# Patient Record
Sex: FEMALE | Race: NATIVE HAWAIIAN OR OTHER PACIFIC ISLANDER | NOT HISPANIC OR LATINO | URBAN - METROPOLITAN AREA
[De-identification: names, ages, dates, MRNs, and addresses within clinical notes are randomized per-mention and may not be internally consistent; named-entity substitution may affect disease eponyms.]

---

## 2023-12-18 ENCOUNTER — INPATIENT (INPATIENT)
Facility: HOSPITAL | Age: 53
LOS: 0 days | Discharge: HOME | DRG: 313 | End: 2023-12-19
Attending: INTERNAL MEDICINE | Admitting: INTERNAL MEDICINE
Payer: COMMERCIAL

## 2023-12-18 VITALS
SYSTOLIC BLOOD PRESSURE: 169 MMHG | OXYGEN SATURATION: 94 % | HEART RATE: 64 BPM | WEIGHT: 145.06 LBS | DIASTOLIC BLOOD PRESSURE: 107 MMHG | HEIGHT: 67 IN | TEMPERATURE: 99 F | RESPIRATION RATE: 20 BRPM

## 2023-12-18 DIAGNOSIS — R07.9 CHEST PAIN, UNSPECIFIED: ICD-10-CM

## 2023-12-18 LAB
ALBUMIN SERPL ELPH-MCNC: 4.3 G/DL — SIGNIFICANT CHANGE UP (ref 3.3–5)
ALBUMIN SERPL ELPH-MCNC: 4.3 G/DL — SIGNIFICANT CHANGE UP (ref 3.3–5)
ALP SERPL-CCNC: 56 U/L — SIGNIFICANT CHANGE UP (ref 40–120)
ALP SERPL-CCNC: 56 U/L — SIGNIFICANT CHANGE UP (ref 40–120)
ALT FLD-CCNC: 20 U/L — SIGNIFICANT CHANGE UP (ref 10–45)
ALT FLD-CCNC: 20 U/L — SIGNIFICANT CHANGE UP (ref 10–45)
ANION GAP SERPL CALC-SCNC: 12 MMOL/L — SIGNIFICANT CHANGE UP (ref 5–17)
ANION GAP SERPL CALC-SCNC: 12 MMOL/L — SIGNIFICANT CHANGE UP (ref 5–17)
AST SERPL-CCNC: 23 U/L — SIGNIFICANT CHANGE UP (ref 10–40)
AST SERPL-CCNC: 23 U/L — SIGNIFICANT CHANGE UP (ref 10–40)
BASOPHILS # BLD AUTO: 0.06 K/UL — SIGNIFICANT CHANGE UP (ref 0–0.2)
BASOPHILS # BLD AUTO: 0.06 K/UL — SIGNIFICANT CHANGE UP (ref 0–0.2)
BASOPHILS NFR BLD AUTO: 0.7 % — SIGNIFICANT CHANGE UP (ref 0–2)
BASOPHILS NFR BLD AUTO: 0.7 % — SIGNIFICANT CHANGE UP (ref 0–2)
BILIRUB SERPL-MCNC: 0.3 MG/DL — SIGNIFICANT CHANGE UP (ref 0.2–1.2)
BILIRUB SERPL-MCNC: 0.3 MG/DL — SIGNIFICANT CHANGE UP (ref 0.2–1.2)
BUN SERPL-MCNC: 12 MG/DL — SIGNIFICANT CHANGE UP (ref 7–23)
BUN SERPL-MCNC: 12 MG/DL — SIGNIFICANT CHANGE UP (ref 7–23)
CALCIUM SERPL-MCNC: 9.5 MG/DL — SIGNIFICANT CHANGE UP (ref 8.4–10.5)
CALCIUM SERPL-MCNC: 9.5 MG/DL — SIGNIFICANT CHANGE UP (ref 8.4–10.5)
CHLORIDE SERPL-SCNC: 101 MMOL/L — SIGNIFICANT CHANGE UP (ref 96–108)
CHLORIDE SERPL-SCNC: 101 MMOL/L — SIGNIFICANT CHANGE UP (ref 96–108)
CO2 SERPL-SCNC: 25 MMOL/L — SIGNIFICANT CHANGE UP (ref 22–31)
CO2 SERPL-SCNC: 25 MMOL/L — SIGNIFICANT CHANGE UP (ref 22–31)
CREAT SERPL-MCNC: 0.57 MG/DL — SIGNIFICANT CHANGE UP (ref 0.5–1.3)
CREAT SERPL-MCNC: 0.57 MG/DL — SIGNIFICANT CHANGE UP (ref 0.5–1.3)
EGFR: 109 ML/MIN/1.73M2 — SIGNIFICANT CHANGE UP
EGFR: 109 ML/MIN/1.73M2 — SIGNIFICANT CHANGE UP
EOSINOPHIL # BLD AUTO: 0.19 K/UL — SIGNIFICANT CHANGE UP (ref 0–0.5)
EOSINOPHIL # BLD AUTO: 0.19 K/UL — SIGNIFICANT CHANGE UP (ref 0–0.5)
EOSINOPHIL NFR BLD AUTO: 2.1 % — SIGNIFICANT CHANGE UP (ref 0–6)
EOSINOPHIL NFR BLD AUTO: 2.1 % — SIGNIFICANT CHANGE UP (ref 0–6)
GLUCOSE SERPL-MCNC: 95 MG/DL — SIGNIFICANT CHANGE UP (ref 70–99)
GLUCOSE SERPL-MCNC: 95 MG/DL — SIGNIFICANT CHANGE UP (ref 70–99)
HCG SERPL-ACNC: <2 MIU/ML — SIGNIFICANT CHANGE UP
HCG SERPL-ACNC: <2 MIU/ML — SIGNIFICANT CHANGE UP
HCT VFR BLD CALC: 39.9 % — SIGNIFICANT CHANGE UP (ref 34.5–45)
HCT VFR BLD CALC: 39.9 % — SIGNIFICANT CHANGE UP (ref 34.5–45)
HGB BLD-MCNC: 12.9 G/DL — SIGNIFICANT CHANGE UP (ref 11.5–15.5)
HGB BLD-MCNC: 12.9 G/DL — SIGNIFICANT CHANGE UP (ref 11.5–15.5)
IMM GRANULOCYTES NFR BLD AUTO: 0.4 % — SIGNIFICANT CHANGE UP (ref 0–0.9)
IMM GRANULOCYTES NFR BLD AUTO: 0.4 % — SIGNIFICANT CHANGE UP (ref 0–0.9)
LYMPHOCYTES # BLD AUTO: 2.02 K/UL — SIGNIFICANT CHANGE UP (ref 1–3.3)
LYMPHOCYTES # BLD AUTO: 2.02 K/UL — SIGNIFICANT CHANGE UP (ref 1–3.3)
LYMPHOCYTES # BLD AUTO: 22.1 % — SIGNIFICANT CHANGE UP (ref 13–44)
LYMPHOCYTES # BLD AUTO: 22.1 % — SIGNIFICANT CHANGE UP (ref 13–44)
MCHC RBC-ENTMCNC: 30 PG — SIGNIFICANT CHANGE UP (ref 27–34)
MCHC RBC-ENTMCNC: 30 PG — SIGNIFICANT CHANGE UP (ref 27–34)
MCHC RBC-ENTMCNC: 32.3 GM/DL — SIGNIFICANT CHANGE UP (ref 32–36)
MCHC RBC-ENTMCNC: 32.3 GM/DL — SIGNIFICANT CHANGE UP (ref 32–36)
MCV RBC AUTO: 92.8 FL — SIGNIFICANT CHANGE UP (ref 80–100)
MCV RBC AUTO: 92.8 FL — SIGNIFICANT CHANGE UP (ref 80–100)
MONOCYTES # BLD AUTO: 0.67 K/UL — SIGNIFICANT CHANGE UP (ref 0–0.9)
MONOCYTES # BLD AUTO: 0.67 K/UL — SIGNIFICANT CHANGE UP (ref 0–0.9)
MONOCYTES NFR BLD AUTO: 7.3 % — SIGNIFICANT CHANGE UP (ref 2–14)
MONOCYTES NFR BLD AUTO: 7.3 % — SIGNIFICANT CHANGE UP (ref 2–14)
NEUTROPHILS # BLD AUTO: 6.16 K/UL — SIGNIFICANT CHANGE UP (ref 1.8–7.4)
NEUTROPHILS # BLD AUTO: 6.16 K/UL — SIGNIFICANT CHANGE UP (ref 1.8–7.4)
NEUTROPHILS NFR BLD AUTO: 67.4 % — SIGNIFICANT CHANGE UP (ref 43–77)
NEUTROPHILS NFR BLD AUTO: 67.4 % — SIGNIFICANT CHANGE UP (ref 43–77)
NRBC # BLD: 0 /100 WBCS — SIGNIFICANT CHANGE UP (ref 0–0)
NRBC # BLD: 0 /100 WBCS — SIGNIFICANT CHANGE UP (ref 0–0)
PLATELET # BLD AUTO: 285 K/UL — SIGNIFICANT CHANGE UP (ref 150–400)
PLATELET # BLD AUTO: 285 K/UL — SIGNIFICANT CHANGE UP (ref 150–400)
POTASSIUM SERPL-MCNC: 4 MMOL/L — SIGNIFICANT CHANGE UP (ref 3.5–5.3)
POTASSIUM SERPL-MCNC: 4 MMOL/L — SIGNIFICANT CHANGE UP (ref 3.5–5.3)
POTASSIUM SERPL-SCNC: 4 MMOL/L — SIGNIFICANT CHANGE UP (ref 3.5–5.3)
POTASSIUM SERPL-SCNC: 4 MMOL/L — SIGNIFICANT CHANGE UP (ref 3.5–5.3)
PROT SERPL-MCNC: 7.4 G/DL — SIGNIFICANT CHANGE UP (ref 6–8.3)
PROT SERPL-MCNC: 7.4 G/DL — SIGNIFICANT CHANGE UP (ref 6–8.3)
RBC # BLD: 4.3 M/UL — SIGNIFICANT CHANGE UP (ref 3.8–5.2)
RBC # BLD: 4.3 M/UL — SIGNIFICANT CHANGE UP (ref 3.8–5.2)
RBC # FLD: 12.8 % — SIGNIFICANT CHANGE UP (ref 10.3–14.5)
RBC # FLD: 12.8 % — SIGNIFICANT CHANGE UP (ref 10.3–14.5)
SODIUM SERPL-SCNC: 138 MMOL/L — SIGNIFICANT CHANGE UP (ref 135–145)
SODIUM SERPL-SCNC: 138 MMOL/L — SIGNIFICANT CHANGE UP (ref 135–145)
TROPONIN T, HIGH SENSITIVITY RESULT: <6 NG/L — SIGNIFICANT CHANGE UP (ref 0–51)
TROPONIN T, HIGH SENSITIVITY RESULT: <6 NG/L — SIGNIFICANT CHANGE UP (ref 0–51)
WBC # BLD: 9.14 K/UL — SIGNIFICANT CHANGE UP (ref 3.8–10.5)
WBC # BLD: 9.14 K/UL — SIGNIFICANT CHANGE UP (ref 3.8–10.5)
WBC # FLD AUTO: 9.14 K/UL — SIGNIFICANT CHANGE UP (ref 3.8–10.5)
WBC # FLD AUTO: 9.14 K/UL — SIGNIFICANT CHANGE UP (ref 3.8–10.5)

## 2023-12-18 PROCEDURE — 71045 X-RAY EXAM CHEST 1 VIEW: CPT | Mod: 26

## 2023-12-18 PROCEDURE — 99285 EMERGENCY DEPT VISIT HI MDM: CPT

## 2023-12-18 RX ORDER — PANTOPRAZOLE SODIUM 20 MG/1
40 TABLET, DELAYED RELEASE ORAL ONCE
Refills: 0 | Status: COMPLETED | OUTPATIENT
Start: 2023-12-18 | End: 2023-12-18

## 2023-12-18 RX ORDER — METOPROLOL TARTRATE 50 MG
1 TABLET ORAL
Refills: 0 | DISCHARGE

## 2023-12-18 RX ORDER — ASPIRIN/CALCIUM CARB/MAGNESIUM 324 MG
81 TABLET ORAL DAILY
Refills: 0 | Status: DISCONTINUED | OUTPATIENT
Start: 2023-12-18 | End: 2023-12-19

## 2023-12-18 RX ORDER — ASPIRIN/CALCIUM CARB/MAGNESIUM 324 MG
1 TABLET ORAL
Refills: 0 | DISCHARGE

## 2023-12-18 RX ORDER — PANTOPRAZOLE SODIUM 20 MG/1
40 TABLET, DELAYED RELEASE ORAL
Refills: 0 | Status: DISCONTINUED | OUTPATIENT
Start: 2023-12-18 | End: 2023-12-19

## 2023-12-18 RX ORDER — ACETAMINOPHEN 500 MG
650 TABLET ORAL ONCE
Refills: 0 | Status: COMPLETED | OUTPATIENT
Start: 2023-12-18 | End: 2023-12-18

## 2023-12-18 RX ORDER — OMEPRAZOLE 10 MG/1
1 CAPSULE, DELAYED RELEASE ORAL
Refills: 0 | DISCHARGE

## 2023-12-18 RX ORDER — METOPROLOL TARTRATE 50 MG
25 TABLET ORAL
Refills: 0 | Status: DISCONTINUED | OUTPATIENT
Start: 2023-12-18 | End: 2023-12-19

## 2023-12-18 RX ORDER — ATORVASTATIN CALCIUM 80 MG/1
1 TABLET, FILM COATED ORAL
Refills: 0 | DISCHARGE

## 2023-12-18 RX ADMIN — Medication 650 MILLIGRAM(S): at 20:45

## 2023-12-18 RX ADMIN — PANTOPRAZOLE SODIUM 40 MILLIGRAM(S): 20 TABLET, DELAYED RELEASE ORAL at 17:29

## 2023-12-18 RX ADMIN — Medication 650 MILLIGRAM(S): at 21:45

## 2023-12-18 NOTE — ED ADULT NURSE NOTE - NS ED NURSE REPORT GIVEN TO FT
NALLELY MARES in [facility] NALLELY MARES in [facility]

## 2023-12-18 NOTE — ED PROVIDER NOTE - ATTENDING CONTRIBUTION TO CARE
Attending MD Christian:  I personally have seen and examined this patient.  Resident note reviewed and agree on plan of care and except where noted.  Please see my MDM for further details.

## 2023-12-18 NOTE — ED PROVIDER NOTE - OBJECTIVE STATEMENT
53 y F, hx of CAD, s/p cath in [place] a few years ago, on aspirin, HTN, HLD, p/w 1-day chest pain started while she was eating.  Took 4 x 81 mg of aspirin.  Currently asymptomatic. no recent illness.  Patient follows cardiologist office in [place]. Last visit was a year ago. History obtained via Mandarin translation by Brittney [id number] 53 y F, hx of CAD, s/p cath in [place] a few years ago, on aspirin, HTN, HLD, p/w 1-day chest pain started while she was eating.  Took 4 x 81 mg of aspirin.  Currently asymptomatic. no recent illness.  Patient follows cardiologist office in [place]. Last visit was a year ago. History obtained via Mandarin translation by Brittney [id number]

## 2023-12-18 NOTE — ED ADULT NURSE NOTE - OBJECTIVE STATEMENT
54 y/o female with PMH MI 2 years ago BIBEMS presenting to ED for sharp midsternal chest pain that started today. EMS gave 324ASA. Pt now has no chest pain. Upon exam pt A&Ox3 gross neuro intact,  no difficulty speaking in complete sentences, s1s2 heart sounds heard, pulses x 4, franco x4, abdomen soft nontender nondistended, skin intact. pt denies chest pain, sob, ha, n/v/d, abdominal pain, f/c, urinary symptoms, hematuria.

## 2023-12-18 NOTE — ED PROVIDER NOTE - CLINICAL SUMMARY MEDICAL DECISION MAKING FREE TEXT BOX
53 y F, hx of CAD, s/p cath in [place] a few years ago, on aspirin, HTN, HLD, p/w 1-day chest pain started while she was eating.  Took 4 x 81 mg of aspirin.  Currently asymptomatic. no recent illness.  Patient follows cardiologist office in [place]. Last visit was a year ago. VSWNL on arrival. PE as noted above. will get EKG, cardiac workup, touch base with cardiology office, admit. 53 y F, hx of CAD, s/p cath in [place] a few years ago, on aspirin, HTN, HLD, p/w 1-day chest pain started while she was eating.  Took 4 x 81 mg of aspirin.  Currently asymptomatic. no recent illness.  Patient follows cardiologist office in [place]. Last visit was a year ago. VSWNL on arrival. PE as noted above. will get EKG, cardiac workup, touch base with cardiology office, admit. 53 y F, hx of CAD, s/p cath in [place] a few years ago, on aspirin, HTN, HLD, p/w 1-day chest pain started while she was eating.  Took 4 x 81 mg of aspirin.  Currently asymptomatic. no recent illness.  Patient follows cardiologist office in [place]. Last visit was a year ago. VSWNL on arrival. PE as noted above. will get EKG, cardiac workup, touch base with cardiology office, admit.    Attending MD Christian: 54 yo female with PMH for CAD, HTN, HLD presents with complaint of chest pain which occurred while eating.  No CP now.  Exam: A & O x 3, NAD, HEENT WNL and no facial asymmetry; lungs CTAB, heart with reg rhythm without murmur; abdomen soft NTND; extremities with no edema; skin with no rashes, neuro exam non focal with no motor or sensory deficits. DDX esophageal spasm, GERD, ACS vs angina.  Plan: Cardiac Monitor, EKG, Labs/cardiac enzymes, ASA (patient took PTA), CXR and admit to telemetry for further workup. 53 y F, hx of CAD, s/p cath in [place] a few years ago, on aspirin, HTN, HLD, p/w 1-day chest pain started while she was eating.  Took 4 x 81 mg of aspirin.  Currently asymptomatic. no recent illness.  Patient follows cardiologist office in [place]. Last visit was a year ago. VSWNL on arrival. PE as noted above. will get EKG, cardiac workup, touch base with cardiology office, admit.    Attending MD Christian: 54 yo female with PMH for CAD, HTN, HLD presents with complaint of chest pain which occurred while eating.  No CP now.  Exam: A & O x 3, NAD, HEENT WNL and no facial asymmetry; lungs CTAB, heart with reg rhythm without murmur; abdomen soft NTND; extremities with no edema; skin with no rashes, neuro exam non focal with no motor or sensory deficits. DDX esophageal spasm, GERD, ACS vs angina.  Plan: Cardiac Monitor, EKG, Labs/cardiac enzymes, ASA (patient took PTA), CXR and admit to telemetry for further workup.

## 2023-12-18 NOTE — H&P ADULT - HISTORY OF PRESENT ILLNESS
Date of Service, 12-18-23 @ 20:50  CHIEF COMPLAINT:Patient is a 53y old  Female who presents with a chief complaint of     HPI:  53 y F, hx of CAD, s/p cath in [place] a few years ago, on aspirin, HTN, HLD, p/w 1-day chest pain started while she was eating.  Took 4 x 81 mg of aspirin.  Currently asymptomatic. no recent illness.  Patient follows cardiologist office in [place]. Last visit was a year ago. History obtained via Mandarin translation by Brittney [id number]    PAST MEDICAL & SURGICAL HISTORY:  HTN (hypertension)  HLD (hyperlipidemia)      MEDICATIONS  (STANDING):  noted    MEDICATIONS  (PRN):      FAMILY HISTORY:      SOCIAL HISTORY:    [x ] Non-smoker  [ ] Smoker  [ ] Alcohol    Allergies    No Known Allergies    Intolerances    	    REVIEW OF SYSTEMS:  CONSTITUTIONAL: No fever, weight loss, or fatigue  EYES: No eye pain, visual disturbances, or discharge  ENT:  No difficulty hearing, tinnitus, vertigo; No sinus or throat pain  NECK: No pain or stiffness  RESPIRATORY: No cough, wheezing, chills or hemoptysis; No Shortness of Breath  CARDIOVASCULAR: + chest pain,no  palpitations, passing out, dizziness, or leg swelling  GASTROINTESTINAL: No abdominal or epigastric pain. No nausea, vomiting, or hematemesis; No diarrhea or constipation. No melena or hematochezia.  GENITOURINARY: No dysuria, frequency, hematuria, or incontinence  NEUROLOGICAL: No headaches, memory loss, loss of strength, numbness, or tremors  SKIN: No itching, burning, rashes, or lesions   LYMPH Nodes: No enlarged glands  ENDOCRINE: No heat or cold intolerance; No hair loss  MUSCULOSKELETAL: No joint pain or swelling; No muscle, back, or extremity pain  PSYCHIATRIC: No depression, anxiety, mood swings, or difficulty sleeping  HEME/LYMPH: No easy bruising, or bleeding gums  ALLERGY AND IMMUNOLOGIC: No hives or eczema	    [x ] All others negative	  [ ] Unable to obtain    PHYSICAL EXAM:  T(C): 36.8 (12-18-23 @ 19:27), Max: 37.2 (12-18-23 @ 14:04)  HR: 65 (12-18-23 @ 19:27) (58 - 65)  BP: 132/84 (12-18-23 @ 19:27) (132/84 - 169/107)  RR: 18 (12-18-23 @ 19:27) (18 - 20)  SpO2: 95% (12-18-23 @ 19:27) (94% - 100%)  Wt(kg): --  I&O's Summary      Appearance: Normal	  HEENT:   Normal oral mucosa, PERRL, EOMI	  Lymphatic: No lymphadenopathy  Cardiovascular: Normal S1 S2, No JVD, + murmurs, No edema  Respiratory: rhonchi  Psychiatry: A & O x 3, Mood & affect appropriate  Gastrointestinal:  Soft, Non-tender, + BS	  Skin: No rashes, No ecchymoses, No cyanosis	  Neurologic: Non-focal  Extremities: Normal range of motion, No clubbing, cyanosis or edema  Vascular: Peripheral pulses palpable 2+ bilaterally    TELEMETRY: 	    ECG:  	  RADIOLOGY:  OTHER: 	  	  LABS:	 	    CARDIAC MARKERS:                              12.9   9.14  )-----------( 285      ( 18 Dec 2023 14:23 )             39.9     12-18    138  |  101  |  12  ----------------------------<  95  4.0   |  25  |  0.57    Ca    9.5      18 Dec 2023 14:23    TPro  7.4  /  Alb  4.3  /  TBili  0.3  /  DBili  x   /  AST  23  /  ALT  20  /  AlkPhos  56  12-18    proBNP:   Lipid Profile:   HgA1c:   TSH:   < from: Xray Chest 1 View- PORTABLE-Urgent (12.18.23 @ 14:25) >  The heart is normal in size.  The lungs are clear.  There is no pneumothorax or pleural effusion.  No acute bony abnormality.    IMPRESSION:  Clear lungs.    Troponin T, High Sensitivity (12.18.23 @ 14:23)    Troponin T, High Sensitivity Result: <6: Moderate Hemolysis,  Troponin T results may be falsely decreased  *  *  Rapid upward or downward changes in high-sensitivity troponin levels  suggest acute myocardial injury. Renal impairment may cause sustained  troponin elevations.  Normal: <6 - 14ng/L  Indeterminate: 15-51 ng/L  Elevated: > 51 ng/L  See http://labs/test/TROPTHS on the [facility] intranet for more  information ng/L                    Date of Service, 12-18-23 @ 20:50  CHIEF COMPLAINT:Patient is a 53y old  Female who presents with a chief complaint of     HPI:  53 y F, hx of CAD, s/p cath in [place] a few years ago, on aspirin, HTN, HLD, p/w 1-day chest pain started while she was eating.  Took 4 x 81 mg of aspirin.  Currently asymptomatic. no recent illness.  Patient follows cardiologist office in [place]. Last visit was a year ago. History obtained via Mandarin translation by Brittney [id number]    PAST MEDICAL & SURGICAL HISTORY:  HTN (hypertension)  HLD (hyperlipidemia)      MEDICATIONS  (STANDING):  noted    MEDICATIONS  (PRN):      FAMILY HISTORY:      SOCIAL HISTORY:    [x ] Non-smoker  [ ] Smoker  [ ] Alcohol    Allergies    No Known Allergies    Intolerances    	    REVIEW OF SYSTEMS:  CONSTITUTIONAL: No fever, weight loss, or fatigue  EYES: No eye pain, visual disturbances, or discharge  ENT:  No difficulty hearing, tinnitus, vertigo; No sinus or throat pain  NECK: No pain or stiffness  RESPIRATORY: No cough, wheezing, chills or hemoptysis; No Shortness of Breath  CARDIOVASCULAR: + chest pain,no  palpitations, passing out, dizziness, or leg swelling  GASTROINTESTINAL: No abdominal or epigastric pain. No nausea, vomiting, or hematemesis; No diarrhea or constipation. No melena or hematochezia.  GENITOURINARY: No dysuria, frequency, hematuria, or incontinence  NEUROLOGICAL: No headaches, memory loss, loss of strength, numbness, or tremors  SKIN: No itching, burning, rashes, or lesions   LYMPH Nodes: No enlarged glands  ENDOCRINE: No heat or cold intolerance; No hair loss  MUSCULOSKELETAL: No joint pain or swelling; No muscle, back, or extremity pain  PSYCHIATRIC: No depression, anxiety, mood swings, or difficulty sleeping  HEME/LYMPH: No easy bruising, or bleeding gums  ALLERGY AND IMMUNOLOGIC: No hives or eczema	    [x ] All others negative	  [ ] Unable to obtain    PHYSICAL EXAM:  T(C): 36.8 (12-18-23 @ 19:27), Max: 37.2 (12-18-23 @ 14:04)  HR: 65 (12-18-23 @ 19:27) (58 - 65)  BP: 132/84 (12-18-23 @ 19:27) (132/84 - 169/107)  RR: 18 (12-18-23 @ 19:27) (18 - 20)  SpO2: 95% (12-18-23 @ 19:27) (94% - 100%)  Wt(kg): --  I&O's Summary      Appearance: Normal	  HEENT:   Normal oral mucosa, PERRL, EOMI	  Lymphatic: No lymphadenopathy  Cardiovascular: Normal S1 S2, No JVD, + murmurs, No edema  Respiratory: rhonchi  Psychiatry: A & O x 3, Mood & affect appropriate  Gastrointestinal:  Soft, Non-tender, + BS	  Skin: No rashes, No ecchymoses, No cyanosis	  Neurologic: Non-focal  Extremities: Normal range of motion, No clubbing, cyanosis or edema  Vascular: Peripheral pulses palpable 2+ bilaterally    TELEMETRY: 	    ECG:  	  RADIOLOGY:  OTHER: 	  	  LABS:	 	    CARDIAC MARKERS:                              12.9   9.14  )-----------( 285      ( 18 Dec 2023 14:23 )             39.9     12-18    138  |  101  |  12  ----------------------------<  95  4.0   |  25  |  0.57    Ca    9.5      18 Dec 2023 14:23    TPro  7.4  /  Alb  4.3  /  TBili  0.3  /  DBili  x   /  AST  23  /  ALT  20  /  AlkPhos  56  12-18    proBNP:   Lipid Profile:   HgA1c:   TSH:   < from: Xray Chest 1 View- PORTABLE-Urgent (12.18.23 @ 14:25) >  The heart is normal in size.  The lungs are clear.  There is no pneumothorax or pleural effusion.  No acute bony abnormality.    IMPRESSION:  Clear lungs.    Troponin T, High Sensitivity (12.18.23 @ 14:23)    Troponin T, High Sensitivity Result: <6: Moderate Hemolysis,  Troponin T results may be falsely decreased  *  *  Rapid upward or downward changes in high-sensitivity troponin levels  suggest acute myocardial injury. Renal impairment may cause sustained  troponin elevations.  Normal: <6 - 14ng/L  Indeterminate: 15-51 ng/L  Elevated: > 51 ng/L  See [email or web address]/test/[email or web address] on the [facility] intranet for more  information ng/L

## 2023-12-18 NOTE — ED PROVIDER NOTE - PHYSICAL EXAMINATION
Gen: Patient is well-appearing, NAD, AAOx3, able to ambulate without assistance  HEENT: NCAT, normal conjunctiva, tongue midline, oral mucosa moist  Lung: CTAB, no respiratory distress, no wheezes/rhonchi/rales B/L, speaking in full sentences  CV: RRR, no murmurs, rubs or gallops, distal pulses 2+ b/l  Abd: soft, NT, ND, no guarding, no rigidity, no rebound tenderness, no CVA tenderness   MSK: no visible deformities, ROM normal in UE/LE  Neuro: No focal sensory or motor deficits  Skin: Warm, well perfused, no rash, no leg swelling  Psych: normal affect, calm

## 2023-12-18 NOTE — H&P ADULT - ASSESSMENT
53 y F, hx of CAD, s/p cath in [place] a few years ago, on aspirin, HTN, HLD, p/w 1-day chest pain started while she was eating.  Took 4 x 81 mg of aspirin.  Currently asymptomatic. no recent illness.  Patient follows cardiologist office in [place]. Last visit was a year ago. History obtained via Mandarin translation by Brittney [id number]  pt with sig cardiac risk factor with new onset of chest pain  beta blocker  asa  lipid panel  cardiac ct in am 53 y F, hx of CAD, s/p cath in [place] a few years ago, on aspirin, HTN, HLD, p/w 1-day chest pain started while she was eating.  Took 4 x 81 mg of aspirin.  Currently asymptomatic. no recent illness.  Patient follows cardiologist office in [place]. Last visit was a year ago. History obtained via Mandarin translation by Brittney [id number]  pt with sig cardiac risk factor with new onset of chest pain  beta blocker  asa  lipid panel  cardiac ct in am

## 2023-12-18 NOTE — ED PROVIDER NOTE - PROGRESS NOTE DETAILS
Gabriela PGY3: EKG changes noted compared to prior EKG from patient's private cardiologist a year ago. will admit.

## 2023-12-19 VITALS
RESPIRATION RATE: 17 BRPM | HEART RATE: 68 BPM | OXYGEN SATURATION: 100 % | SYSTOLIC BLOOD PRESSURE: 142 MMHG | DIASTOLIC BLOOD PRESSURE: 92 MMHG | TEMPERATURE: 98 F

## 2023-12-19 LAB
CHOLEST SERPL-MCNC: 171 MG/DL — SIGNIFICANT CHANGE UP
CHOLEST SERPL-MCNC: 171 MG/DL — SIGNIFICANT CHANGE UP
D DIMER BLD IA.RAPID-MCNC: <150 NG/ML DDU — SIGNIFICANT CHANGE UP
D DIMER BLD IA.RAPID-MCNC: <150 NG/ML DDU — SIGNIFICANT CHANGE UP
HDLC SERPL-MCNC: 60 MG/DL — SIGNIFICANT CHANGE UP
HDLC SERPL-MCNC: 60 MG/DL — SIGNIFICANT CHANGE UP
LIPID PNL WITH DIRECT LDL SERPL: 86 MG/DL — SIGNIFICANT CHANGE UP
LIPID PNL WITH DIRECT LDL SERPL: 86 MG/DL — SIGNIFICANT CHANGE UP
NON HDL CHOLESTEROL: 111 MG/DL — SIGNIFICANT CHANGE UP
NON HDL CHOLESTEROL: 111 MG/DL — SIGNIFICANT CHANGE UP
TRIGL SERPL-MCNC: 148 MG/DL — SIGNIFICANT CHANGE UP
TRIGL SERPL-MCNC: 148 MG/DL — SIGNIFICANT CHANGE UP
TSH SERPL-MCNC: 0.58 UIU/ML — SIGNIFICANT CHANGE UP (ref 0.27–4.2)
TSH SERPL-MCNC: 0.58 UIU/ML — SIGNIFICANT CHANGE UP (ref 0.27–4.2)

## 2023-12-19 PROCEDURE — 80053 COMPREHEN METABOLIC PANEL: CPT

## 2023-12-19 PROCEDURE — 84443 ASSAY THYROID STIM HORMONE: CPT

## 2023-12-19 PROCEDURE — 75574 CT ANGIO HRT W/3D IMAGE: CPT | Mod: 26

## 2023-12-19 PROCEDURE — 80061 LIPID PANEL: CPT

## 2023-12-19 PROCEDURE — 71045 X-RAY EXAM CHEST 1 VIEW: CPT

## 2023-12-19 PROCEDURE — 84484 ASSAY OF TROPONIN QUANT: CPT

## 2023-12-19 PROCEDURE — 99285 EMERGENCY DEPT VISIT HI MDM: CPT | Mod: 25

## 2023-12-19 PROCEDURE — 85025 COMPLETE CBC W/AUTO DIFF WBC: CPT

## 2023-12-19 PROCEDURE — 36415 COLL VENOUS BLD VENIPUNCTURE: CPT

## 2023-12-19 PROCEDURE — 85379 FIBRIN DEGRADATION QUANT: CPT

## 2023-12-19 PROCEDURE — 75574 CT ANGIO HRT W/3D IMAGE: CPT

## 2023-12-19 PROCEDURE — 84702 CHORIONIC GONADOTROPIN TEST: CPT

## 2023-12-19 RX ORDER — ACETAMINOPHEN 500 MG
650 TABLET ORAL ONCE
Refills: 0 | Status: COMPLETED | OUTPATIENT
Start: 2023-12-19 | End: 2023-12-19

## 2023-12-19 RX ADMIN — Medication 81 MILLIGRAM(S): at 11:26

## 2023-12-19 RX ADMIN — Medication 650 MILLIGRAM(S): at 13:12

## 2023-12-19 RX ADMIN — Medication 25 MILLIGRAM(S): at 06:20

## 2023-12-19 RX ADMIN — Medication 25 MILLIGRAM(S): at 18:36

## 2023-12-19 RX ADMIN — PANTOPRAZOLE SODIUM 40 MILLIGRAM(S): 20 TABLET, DELAYED RELEASE ORAL at 06:20

## 2023-12-19 RX ADMIN — Medication 650 MILLIGRAM(S): at 11:55

## 2023-12-19 NOTE — DISCHARGE NOTE NURSING/CASE MANAGEMENT/SOCIAL WORK - PATIENT PORTAL LINK FT
You can access the FollowMyHealth Patient Portal offered by [facility] by registering at the following website: [email or web address]. By joining [employer]’s FollowMyHealth portal, you will also be able to view your health information using other applications (apps) compatible with our system. You can access the FollowMyHealth Patient Portal offered by [facility] by registering at the following website: [email or web address]. By joining [employer]’s FollowMyHealth portal, you will also be able to view your health information using other applications (apps) compatible with our system.

## 2023-12-19 NOTE — DISCHARGE NOTE NURSING/CASE MANAGEMENT/SOCIAL WORK - NSDCPEFALRISK_GEN_ALL_CORE
For information on Fall & Injury Prevention, visit: [email or web address] OR  [email or web address] OR  https://www.cdc.gov/steadi/patient.html For information on Fall & Injury Prevention, visit: [email or web address] OR  [email or web address] OR  https://www.cdc.gov/steadi/patient.html

## 2023-12-19 NOTE — DISCHARGE NOTE PROVIDER - PROVIDER TOKENS
PROVIDER:[TOKEN:[[id number]]] PROVIDER:[TOKEN:[[id number]]] FREE:[LAST:[Cher],FIRST:[Md],PHONE:[phone]],FAX:[(   )    -],ADDRESS:[[address] [address]]] FREE:[LAST:[Cher],FIRST:[Md],PHONE:[phone]],FAX:[(   )    -],ADDRESS:[[address] [address]]]

## 2023-12-19 NOTE — DISCHARGE NOTE PROVIDER - NSDCMRMEDTOKEN_GEN_ALL_CORE_FT
aspirin 81 mg oral tablet, chewable: 1 tab(s) chewed once a day  atorvastatin 20 mg oral tablet: 1 tab(s) orally once a day (at bedtime)  benzonatate 200 mg oral capsule: 1 cap(s) orally 3 times a day as needed for cough  metoprolol succinate 25 mg oral tablet, extended release: 1 tab(s) orally once a day  omeprazole 40 mg oral delayed release capsule: 1 cap(s) orally once a day as needed  Refresh ophthalmic solution: 1 drop(s) in each eye 4 times a day as needed

## 2023-12-19 NOTE — DISCHARGE NOTE PROVIDER - NSDCCPCAREPLAN_GEN_ALL_CORE_FT
PRINCIPAL DISCHARGE DIAGNOSIS  Diagnosis: Chest pain  Assessment and Plan of Treatment: You were admitted for Chest pain and had a cardiac workup due to changes noted on your electrocardiogram. You had a CT of your coronaries which did not show evident of cardiac disease or blockage. Please follow up with your cardiologist/PMD if you have further episodes of chest pain or return to the Emergency department.      SECONDARY DISCHARGE DIAGNOSES  Diagnosis: Lung nodule  Assessment and Plan of Treatment: You were found to have a 5 mm left lower lobe nodule on the CT you had done. Your risk of lung cancer is determined by your physician. Please follow up with your PMD to evaluate if further workup of nodule is needed at this time. If low risk, no further follow-up is needed.

## 2023-12-19 NOTE — DISCHARGE NOTE PROVIDER - CARE PROVIDER_API CALL
Terrie Mathews  Cardiovascular Disease  [address] [place] [id number]  Phone: [phone]  Fax: [phone]  Follow Up Time:    Terrie Mathews  Cardiovascular Disease  [address] [place] [id number]  Phone: [phone]  Fax: [phone]  Follow Up Time:    Md Cher  [address] [facility] [address]  Phone: [phone]  Fax: (   )    -  Follow Up Time:    Md Cher  [address] [facility] [address]  Phone: [phone]  Fax: (   )    -  Follow Up Time:

## 2023-12-19 NOTE — PROGRESS NOTE ADULT - SUBJECTIVE AND OBJECTIVE BOX
Date of Service: 12-19-23 @ 08:34           CARDIOLOGY     PROGRESS  NOTE   ________________________________________________    CHIEF COMPLAINT:Patient is a 53y old  Female who presents with a chief complaint of chest pain (18 Dec 2023 20:50)  no complain  	  REVIEW OF SYSTEMS:  CONSTITUTIONAL: No fever, weight loss, or fatigue  EYES: No eye pain, visual disturbances, or discharge  ENT:  No difficulty hearing, tinnitus, vertigo; No sinus or throat pain  NECK: No pain or stiffness  RESPIRATORY: No cough, wheezing, chills or hemoptysis; No Shortness of Breath  CARDIOVASCULAR: No chest pain, palpitations, passing out, dizziness, or leg swelling  GASTROINTESTINAL: No abdominal or epigastric pain. No nausea, vomiting, or hematemesis; No diarrhea or constipation. No melena or hematochezia.  GENITOURINARY: No dysuria, frequency, hematuria, or incontinence  NEUROLOGICAL: No headaches, memory loss, loss of strength, numbness, or tremors  SKIN: No itching, burning, rashes, or lesions   LYMPH Nodes: No enlarged glands  ENDOCRINE: No heat or cold intolerance; No hair loss  MUSCULOSKELETAL: No joint pain or swelling; No muscle, back, or extremity pain  PSYCHIATRIC: No depression, anxiety, mood swings, or difficulty sleeping  HEME/LYMPH: No easy bruising, or bleeding gums  ALLERGY AND IMMUNOLOGIC: No hives or eczema	    [x ] All others negative	  [ ] Unable to obtain    PHYSICAL EXAM:  T(C): 36.7 (12-19-23 @ 05:05), Max: 37.2 (12-18-23 @ 14:04)  HR: 66 (12-19-23 @ 05:05) (58 - 66)  BP: 109/66 (12-19-23 @ 05:05) (109/66 - 169/107)  RR: 18 (12-19-23 @ 05:05) (18 - 20)  SpO2: 96% (12-19-23 @ 05:05) (94% - 100%)  Wt(kg): --  I&O's Summary      Appearance: Normal	  HEENT:   Normal oral mucosa, PERRL, EOMI	  Lymphatic: No lymphadenopathy  Cardiovascular: Normal S1 S2, No JVD, + murmurs, No edema  Respiratory rhonchi  Psychiatry: A & O x 3, Mood & affect appropriate  Gastrointestinal:  Soft, Non-tender, + BS	  Skin: No rashes, No ecchymoses, No cyanosis	  Neurologic: Non-focal  Extremities: Normal range of motion, No clubbing, cyanosis or edema  Vascular: Peripheral pulses palpable 2+ bilaterally    MEDICATIONS  (STANDING):  aspirin enteric coated 81 milliGRAM(s) Oral daily  metoprolol tartrate 25 milliGRAM(s) Oral two times a day  pantoprazole    Tablet 40 milliGRAM(s) Oral before breakfast      TELEMETRY: 	    ECG:  	  RADIOLOGY:  OTHER: 	  	  LABS:	 	    CARDIAC MARKERS:                            12.9   9.14  )-----------( 285      ( 18 Dec 2023 14:23 )             39.9     12-18    138  |  101  |  12  ----------------------------<  95  4.0   |  25  |  0.57    Ca    9.5      18 Dec 2023 14:23    TPro  7.4  /  Alb  4.3  /  TBili  0.3  /  DBili  x   /  AST  23  /  ALT  20  /  AlkPhos  56  12-18    proBNP:   Lipid Profile:   HgA1c:   TSH:     Troponin T, High Sensitivity (12.18.23 @ 14:23)    Troponin T, High Sensitivity Result: <6: Moderate Hemolysis,  Troponin T results may be falsely decreased  *  *  Rapid upward or downward changes in high-sensitivity troponin levels  suggest acute myocardial injury. Renal impairment may cause sustained  troponin elevations.  Normal: <6 - 14ng/L  Indeterminate: 15-51 ng/L  Elevated: > 51 ng/L  See [email or web address]/[email or web address] on the [facility] intranet for more  information ng/L      Assessment and plan  ---------------------------  53 y F, hx of CAD, s/p cath in [place] a few years ago, on aspirin, HTN, HLD, p/w 1-day chest pain started while she was eating.  Took 4 x 81 mg of aspirin.  Currently asymptomatic. no recent illness.  Patient follows cardiologist office in [place]. Last visit was a year ago. History obtained via Mandarin translation by Brittney [id number]  pt with sig cardiac risk factor with new onset of chest pain  beta blocker  asa  lipid panel  cardiac ct awaiting  oob to chair  lipid pannel    	         Date of Service: 12-19-23 @ 08:34           CARDIOLOGY     PROGRESS  NOTE   ________________________________________________    CHIEF COMPLAINT:Patient is a 53y old  Female who presents with a chief complaint of chest pain (18 Dec 2023 20:50)  no complain  	  REVIEW OF SYSTEMS:  CONSTITUTIONAL: No fever, weight loss, or fatigue  EYES: No eye pain, visual disturbances, or discharge  ENT:  No difficulty hearing, tinnitus, vertigo; No sinus or throat pain  NECK: No pain or stiffness  RESPIRATORY: No cough, wheezing, chills or hemoptysis; No Shortness of Breath  CARDIOVASCULAR: No chest pain, palpitations, passing out, dizziness, or leg swelling  GASTROINTESTINAL: No abdominal or epigastric pain. No nausea, vomiting, or hematemesis; No diarrhea or constipation. No melena or hematochezia.  GENITOURINARY: No dysuria, frequency, hematuria, or incontinence  NEUROLOGICAL: No headaches, memory loss, loss of strength, numbness, or tremors  SKIN: No itching, burning, rashes, or lesions   LYMPH Nodes: No enlarged glands  ENDOCRINE: No heat or cold intolerance; No hair loss  MUSCULOSKELETAL: No joint pain or swelling; No muscle, back, or extremity pain  PSYCHIATRIC: No depression, anxiety, mood swings, or difficulty sleeping  HEME/LYMPH: No easy bruising, or bleeding gums  ALLERGY AND IMMUNOLOGIC: No hives or eczema	    [x ] All others negative	  [ ] Unable to obtain    PHYSICAL EXAM:  T(C): 36.7 (12-19-23 @ 05:05), Max: 37.2 (12-18-23 @ 14:04)  HR: 66 (12-19-23 @ 05:05) (58 - 66)  BP: 109/66 (12-19-23 @ 05:05) (109/66 - 169/107)  RR: 18 (12-19-23 @ 05:05) (18 - 20)  SpO2: 96% (12-19-23 @ 05:05) (94% - 100%)  Wt(kg): --  I&O's Summary      Appearance: Normal	  HEENT:   Normal oral mucosa, PERRL, EOMI	  Lymphatic: No lymphadenopathy  Cardiovascular: Normal S1 S2, No JVD, + murmurs, No edema  Respiratory rhonchi  Psychiatry: A & O x 3, Mood & affect appropriate  Gastrointestinal:  Soft, Non-tender, + BS	  Skin: No rashes, No ecchymoses, No cyanosis	  Neurologic: Non-focal  Extremities: Normal range of motion, No clubbing, cyanosis or edema  Vascular: Peripheral pulses palpable 2+ bilaterally    MEDICATIONS  (STANDING):  aspirin enteric coated 81 milliGRAM(s) Oral daily  metoprolol tartrate 25 milliGRAM(s) Oral two times a day  pantoprazole    Tablet 40 milliGRAM(s) Oral before breakfast      TELEMETRY: 	    ECG:  	  RADIOLOGY:  OTHER: 	  	  LABS:	 	    CARDIAC MARKERS:                            12.9   9.14  )-----------( 285      ( 18 Dec 2023 14:23 )             39.9     12-18    138  |  101  |  12  ----------------------------<  95  4.0   |  25  |  0.57    Ca    9.5      18 Dec 2023 14:23    TPro  7.4  /  Alb  4.3  /  TBili  0.3  /  DBili  x   /  AST  23  /  ALT  20  /  AlkPhos  56  12-18    proBNP:   Lipid Profile:   HgA1c:   TSH:     Troponin T, High Sensitivity (12.18.23 @ 14:23)    Troponin T, High Sensitivity Result: <6: Moderate Hemolysis,  Troponin T results may be falsely decreased  *  *  Rapid upward or downward changes in high-sensitivity troponin levels  suggest acute myocardial injury. Renal impairment may cause sustained  troponin elevations.  Normal: <6 - 14ng/L  Indeterminate: 15-51 ng/L  Elevated: > 51 ng/L  See [email or web address]/[email or web address] on the [facility] intranet for more  information ng/L      Assessment and plan  ---------------------------  53 y F, hx of CAD, s/p cath in [place] a few years ago, on aspirin, HTN, HLD, p/w 1-day chest pain started while she was eating.  Took 4 x 81 mg of aspirin.  Currently asymptomatic. no recent illness.  Patient follows cardiologist office in [place]. Last visit was a year ago. History obtained via Mandarin translation by Brittney [id number]  pt with sig cardiac risk factor with new onset of chest pain  beta blocker  asa  lipid panel  cardiac ct awaiting  oob to chair  lipid pannel

## 2023-12-19 NOTE — DISCHARGE NOTE PROVIDER - HOSPITAL COURSE
HPI:  53 y F, hx of CAD, s/p cath in [place] a few years ago, on aspirin, HTN, HLD, p/w 1-day chest pain started while she was eating.    Hospital Course:  53 y F, hx of CAD, s/p cath in [place] a few years ago, on aspirin, HTN, HLD, p/w 1-day chest pain started while she was eating. EKG changes noted compared to prior EKG from patient's private cardiologist a year ago. CTA coronaries w/o No significant stenosis. Multivessel minimal stenoses due to noncalcified plaque. Calculated Agatston score of 0.  5 mm left lower lobe nodule; if patient is considered high risk for lung cancer, consider follow-up low dose chest CT in 12 months. If low risk, no further follow-up is needed as per current Fleischner guidelines. Patient stable for DC with f/u with PMD for further evaluation of lung nodule.     Important Medication Changes and Reason:  None   Active or Pending Issues Requiring Follow-up:  F/U with PMD for incidental lung nodule found.   Advanced Directives:   [ X] Full code  [ ] DNR  [ ] Hospice    Discharge Diagnoses:  Chest pain with neg CTA coronaries